# Patient Record
Sex: FEMALE | Race: WHITE | Employment: OTHER | URBAN - METROPOLITAN AREA
[De-identification: names, ages, dates, MRNs, and addresses within clinical notes are randomized per-mention and may not be internally consistent; named-entity substitution may affect disease eponyms.]

---

## 2024-01-28 ENCOUNTER — APPOINTMENT (OUTPATIENT)
Dept: GENERAL RADIOLOGY | Facility: HOSPITAL | Age: 89
End: 2024-01-28
Attending: STUDENT IN AN ORGANIZED HEALTH CARE EDUCATION/TRAINING PROGRAM
Payer: MEDICARE

## 2024-01-28 ENCOUNTER — APPOINTMENT (OUTPATIENT)
Dept: CT IMAGING | Facility: HOSPITAL | Age: 89
End: 2024-01-28
Attending: STUDENT IN AN ORGANIZED HEALTH CARE EDUCATION/TRAINING PROGRAM
Payer: MEDICARE

## 2024-01-28 ENCOUNTER — HOSPITAL ENCOUNTER (EMERGENCY)
Facility: HOSPITAL | Age: 89
Discharge: HOME | End: 2024-01-28
Attending: STUDENT IN AN ORGANIZED HEALTH CARE EDUCATION/TRAINING PROGRAM
Payer: MEDICARE

## 2024-01-28 VITALS
RESPIRATION RATE: 14 BRPM | HEART RATE: 62 BPM | TEMPERATURE: 97 F | SYSTOLIC BLOOD PRESSURE: 118 MMHG | OXYGEN SATURATION: 98 % | DIASTOLIC BLOOD PRESSURE: 53 MMHG

## 2024-01-28 DIAGNOSIS — N30.01 ACUTE CYSTITIS WITH HEMATURIA: Primary | ICD-10-CM

## 2024-01-28 DIAGNOSIS — R44.3 HALLUCINATIONS: ICD-10-CM

## 2024-01-28 LAB
ALBUMIN SERPL-MCNC: 3.6 G/DL (ref 3.2–4.8)
ALBUMIN/GLOB SERPL: 1 {RATIO} (ref 1–2)
ALP LIVER SERPL-CCNC: 100 U/L
ALT SERPL-CCNC: 12 U/L
ANION GAP SERPL CALC-SCNC: 8 MMOL/L (ref 0–18)
AST SERPL-CCNC: 15 U/L (ref ?–34)
BASOPHILS # BLD AUTO: 0.04 X10(3) UL (ref 0–0.2)
BASOPHILS NFR BLD AUTO: 0.5 %
BILIRUB SERPL-MCNC: 0.5 MG/DL (ref 0.2–0.9)
BILIRUB UR QL: NEGATIVE
BUN BLD-MCNC: 28 MG/DL (ref 9–23)
BUN/CREAT SERPL: 12 (ref 10–20)
CALCIUM BLD-MCNC: 10 MG/DL (ref 8.7–10.4)
CHLORIDE SERPL-SCNC: 100 MMOL/L (ref 98–112)
CO2 SERPL-SCNC: 28 MMOL/L (ref 21–32)
COLOR UR: YELLOW
CREAT BLD-MCNC: 2.33 MG/DL
DEPRECATED RDW RBC AUTO: 54.3 FL (ref 35.1–46.3)
EGFRCR SERPLBLD CKD-EPI 2021: 19 ML/MIN/1.73M2 (ref 60–?)
EOSINOPHIL # BLD AUTO: 0.11 X10(3) UL (ref 0–0.7)
EOSINOPHIL NFR BLD AUTO: 1.3 %
ERYTHROCYTE [DISTWIDTH] IN BLOOD BY AUTOMATED COUNT: 15 % (ref 11–15)
GLOBULIN PLAS-MCNC: 3.6 G/DL (ref 2.8–4.4)
GLUCOSE BLD-MCNC: 130 MG/DL (ref 70–99)
GLUCOSE UR-MCNC: NORMAL MG/DL
HCT VFR BLD AUTO: 36.5 %
HGB BLD-MCNC: 11.8 G/DL
HYALINE CASTS #/AREA URNS AUTO: PRESENT /LPF
IMM GRANULOCYTES # BLD AUTO: 0.02 X10(3) UL (ref 0–1)
IMM GRANULOCYTES NFR BLD: 0.2 %
KETONES UR-MCNC: NEGATIVE MG/DL
LEUKOCYTE ESTERASE UR QL STRIP.AUTO: NEGATIVE
LYMPHOCYTES # BLD AUTO: 1.03 X10(3) UL (ref 1–4)
LYMPHOCYTES NFR BLD AUTO: 11.7 %
MCH RBC QN AUTO: 31.8 PG (ref 26–34)
MCHC RBC AUTO-ENTMCNC: 32.3 G/DL (ref 31–37)
MCV RBC AUTO: 98.4 FL
MONOCYTES # BLD AUTO: 0.88 X10(3) UL (ref 0.1–1)
MONOCYTES NFR BLD AUTO: 10 %
NEUTROPHILS # BLD AUTO: 6.69 X10 (3) UL (ref 1.5–7.7)
NEUTROPHILS # BLD AUTO: 6.69 X10(3) UL (ref 1.5–7.7)
NEUTROPHILS NFR BLD AUTO: 76.3 %
OSMOLALITY SERPL CALC.SUM OF ELEC: 289 MOSM/KG (ref 275–295)
PH UR: 7 [PH] (ref 5–8)
PLATELET # BLD AUTO: 240 10(3)UL (ref 150–450)
POTASSIUM SERPL-SCNC: 4.3 MMOL/L (ref 3.5–5.1)
PROT SERPL-MCNC: 7.2 G/DL (ref 5.7–8.2)
PROT UR-MCNC: 20 MG/DL
RBC # BLD AUTO: 3.71 X10(6)UL
RBC #/AREA URNS AUTO: >10 /HPF
SODIUM SERPL-SCNC: 136 MMOL/L (ref 136–145)
SP GR UR STRIP: 1.02 (ref 1–1.03)
UROBILINOGEN UR STRIP-ACNC: NORMAL
WBC # BLD AUTO: 8.8 X10(3) UL (ref 4–11)

## 2024-01-28 PROCEDURE — 85025 COMPLETE CBC W/AUTO DIFF WBC: CPT | Performed by: STUDENT IN AN ORGANIZED HEALTH CARE EDUCATION/TRAINING PROGRAM

## 2024-01-28 PROCEDURE — 80053 COMPREHEN METABOLIC PANEL: CPT | Performed by: STUDENT IN AN ORGANIZED HEALTH CARE EDUCATION/TRAINING PROGRAM

## 2024-01-28 PROCEDURE — 99285 EMERGENCY DEPT VISIT HI MDM: CPT

## 2024-01-28 PROCEDURE — 81001 URINALYSIS AUTO W/SCOPE: CPT | Performed by: STUDENT IN AN ORGANIZED HEALTH CARE EDUCATION/TRAINING PROGRAM

## 2024-01-28 PROCEDURE — 87086 URINE CULTURE/COLONY COUNT: CPT | Performed by: STUDENT IN AN ORGANIZED HEALTH CARE EDUCATION/TRAINING PROGRAM

## 2024-01-28 PROCEDURE — 70450 CT HEAD/BRAIN W/O DYE: CPT | Performed by: STUDENT IN AN ORGANIZED HEALTH CARE EDUCATION/TRAINING PROGRAM

## 2024-01-28 PROCEDURE — 87077 CULTURE AEROBIC IDENTIFY: CPT | Performed by: STUDENT IN AN ORGANIZED HEALTH CARE EDUCATION/TRAINING PROGRAM

## 2024-01-28 PROCEDURE — 36415 COLL VENOUS BLD VENIPUNCTURE: CPT

## 2024-01-28 PROCEDURE — 87186 SC STD MICRODIL/AGAR DIL: CPT | Performed by: STUDENT IN AN ORGANIZED HEALTH CARE EDUCATION/TRAINING PROGRAM

## 2024-01-28 PROCEDURE — 71045 X-RAY EXAM CHEST 1 VIEW: CPT | Performed by: STUDENT IN AN ORGANIZED HEALTH CARE EDUCATION/TRAINING PROGRAM

## 2024-01-28 RX ORDER — FUROSEMIDE 20 MG/1
20 TABLET ORAL AS NEEDED
COMMUNITY

## 2024-01-28 RX ORDER — ALLOPURINOL 100 MG/1
100 TABLET ORAL DAILY
COMMUNITY

## 2024-01-28 NOTE — DISCHARGE INSTRUCTIONS
Thank you for seeking care at [facility].  You have been seen and evaluated.We reviewed the results from your visit in the emergency department. You were found to have an urinary tract infection.  Please read the instructions provided, and if given prescriptions, take as instructed.     Continue with the previously prescribed Bactrim.  If cultures show any resistance to Bactrim, we will call and discuss alternative antibiotic options.    Remember, your care process does not end after your visit today. Please follow-up with your doctor within 1-2 days for a follow-up check to ensure you are  improving, to see if you need any further evaluation/testing, or to evaluate for any alternate diagnoses.     You should return to the emergency department if you develop severe nausea and vomiting AND are unable to keep liquids down, if you develop severe back/flank or stomach pain, or if your symptoms are not clearly improving at home.     We hope you feel better.

## 2024-01-28 NOTE — ED PROVIDER NOTES
[place] Emergency Department Note  Patient: Eve Rodriguez Age: 89 year old Sex: female      MRN: [id number]  : 7/3/1931    Patient Seen in: [facility] Emergency Department    History     Chief Complaint   Patient presents with    Altered Mental Status     Stated Complaint: hallucination not sleeping    History obtained from: Patient's daughter    89-year-old female with past medical history of dementia, CKD, and hyperlipidemia presenting today for evaluation of altered mental status.  Patient's daughter states that the patient is in town from [place].  Typically lives with a different daughter who is her primary caretaker.  States that at baseline, the patient has dementia and has fluctuating mental status.  States that starting 2 days ago, the patient began having visual hallucinations stating that she sees cats and dogs running around her house.  Did not sleep last night due to hallucinations and altered mental status.  Otherwise denies any complaints.  No fevers.  No complaints of pain.  No nausea or vomiting or diarrhea.  Urinating normally.  Eating and drinking without difficulty.  Patient's daughter states that she returns to [place] next week and wants to make sure she was safe to travel.    The patient's daughter who is present at bedside is a pediatrician and states that she started her on Bactrim empirically for suspected urinary tract infection 24 hours ago.  Patient has so far taken 2 doses.    Review of Systems:  Review of Systems  Positive for stated complaint: hallucination not sleeping. Constitutional and vital signs reviewed. All other systems reviewed and negative except as noted above.    Patient History:  Past Medical History:   Diagnosis Date    Dementia (HCC)     Hyperlipidemia        No past surgical history on file.     No family history on file.    Specific Social Determinants of Health:   Social History     Socioeconomic History    Marital status:            [facility]  elements reviewed from today and agreed except as otherwise stated in HPI.    Physical Exam     ED Triage Vitals [01/28/24 1037]   BP (!) 80/40   Pulse 73   Resp 20   Temp 97.4 °F (36.3 °C)   Temp src    SpO2 95 %   O2 Device None (Room air)       Current:/53   Pulse 62   Temp 97.4 °F (36.3 °C)   Resp 14   SpO2 98%         Physical Exam  Constitutional:       General: She is not in acute distress.     Comments: Sleeping comfortably   HENT:      Head: Normocephalic and atraumatic.      Mouth/Throat:      Mouth: Mucous membranes are moist.   Eyes:      Extraocular Movements: Extraocular movements intact.   Cardiovascular:      Rate and Rhythm: Normal rate and regular rhythm.      Heart sounds: Normal heart sounds.   Pulmonary:      Effort: Pulmonary effort is normal. No respiratory distress.      Breath sounds: Normal breath sounds.   Abdominal:      Palpations: Abdomen is soft.      Tenderness: There is no abdominal tenderness.   Skin:     General: Skin is warm and dry.      Capillary Refill: Capillary refill takes less than 2 seconds.      Findings: No rash.   Neurological:      General: No focal deficit present.      Mental Status: She is alert and oriented to person, place, and time.   Psychiatric:         Mood and Affect: Mood normal.         Behavior: Behavior normal.         ED Course   Labs:   Labs Reviewed   COMP METABOLIC PANEL (14) - Abnormal; Notable for the following components:       Result Value    Glucose 130 (*)     BUN 28 (*)     Creatinine 2.33 (*)     eGFR-Cr 19 (*)     All other components within normal limits   URINALYSIS WITH CULTURE REFLEX - Abnormal; Notable for the following components:    Clarity Urine Turbid (*)     Blood Urine 1+ (*)     Protein Urine 20 (*)     Nitrite Urine 1+ (*)     RBC Urine >10 (*)     Bacteria Urine Rare (*)     Squamous Epi. Cells Few (*)     Ca Oxalate Crystals Moderate (*)     Hyaline Casts Present (*)     All other components within normal limits   CBC  W/ DIFFERENTIAL - Abnormal; Notable for the following components:    RBC 3.71 (*)     HGB 11.8 (*)     RDW-SD 54.3 (*)     All other components within normal limits   CBC WITH DIFFERENTIAL WITH PLATELET    Narrative:     The following orders were created for panel order CBC With Differential With Platelet.  Procedure                               Abnormality         Status                     ---------                               -----------         ------                     CBC W/ DIFFERENTIAL[[id number]]          Abnormal            Final result                 Please view results for these tests on the individual orders.   RAINBOW DRAW LAVENDER   RAINBOW DRAW LIGHT GREEN   RAINBOW DRAW BLUE   RAINBOW DRAW GOLD   URINE CULTURE, ROUTINE     Radiology findings:  I personally reviewed the images.   CT BRAIN OR HEAD (21086)    Result Date: 1/28/2024  CONCLUSION:  1. No acute intracranial process by noncontrast CT technique. 2. 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 3. Nonspecific white matter changes involving both cerebral hemispheres that most likely reflect sequelae of chronic microangiopathy. 4. Intracranial atherosclerosis.   Dictated by (CST): Jaime Herrera MD on 1/28/2024 at 1:50 PM     Finalized by (CST): Jaime Herrera MD on 1/28/2024 at 1:52 PM          XR CHEST AP PORTABLE  (CPT=71045)    Result Date: 1/28/2024  CONCLUSION:  1. Negative for radiographically evident acute intrathoracic process. 2. Left chest wall dual lead pacemaker.   Dictated by (CST): Jaime Herrera MD on 1/28/2024 at 12:06 PM     Finalized by (CST): Jaime Herrera MD on 1/28/2024 at 12:08 PM             Cardiac Monitor: Interpreted by me.   Pulse Readings from Last 1 Encounters:   01/28/24 62   , sinus,     External non-ED records reviewed independently by me: No outside records available for review    MDM   89-year-old female with past medical history of dementia, CKD, hyperlipidemia presenting  today for evaluation of altered mental status with new onset of hallucinations over the past 2 days.  Upon arrival to emergency department, patient is well-appearing and in no acute distress.  Initially hypotensive which resolved spontaneously.  Afebrile.  Abdomen is soft and nontender.  Lungs clear to auscultation bilaterally.  No skin rash.  Appears well-perfused and well-hydrated.    Differential diagnoses considered includes, but is not limited to: UTI, electrolyte or metabolic derangement, mass occupying intracranial lesion, aspiration pneumonia, deterioration of dementia    Will obtain the following tests: CBC, CMP, urinalysis, urine culture, CT brain, chest x-ray  Please see ED course for my independent review of these tests/imaging results.    Initial Medications/Therapeutics administered: None    Chronic conditions affecting care: Dementia, CKD, hyperlipidemia    Workup and medications considered but not ordered: None    Social Determinants of Health that impacted care: None     ED course: Laboratory workup is reassuring.  I discussed the patient's creatinine of 2.33 with her daughter who is present at bedside.  Showed me outpatient labs from [place] at an outside facility with baseline creatinine of 1.8.  Patient otherwise without any abnormal electrolyte derangements to suggest acute dehydration.  She appears well-hydrated.  Suspect likely progression of chronic renal disease.  Urinalysis with evidence of borderline urinary tract infection.  Patient has already undergone 24 hours of Bactrim.  Will send culture.  Discussed continuation of Bactrim for likely urinary tract infection.  Independently reviewed the chest x-ray images that show no evidence of pneumonia.  Agree with radiology reads above.  CT brain also with no evidence of mass occupying intracranial lesion.  Patient's daughter would like to take the patient home and continue the Bactrim that was previously initiated.  Stable for discharge home at  this time.  Return precautions were provided and all questions answered.  Patient and patient's daughter expressed understanding and agreement with this plan.    Disposition and Plan     Clinical Impression:  1. Acute cystitis with hematuria    2. Hallucinations        Disposition:  Discharge    Follow-up:  Your PCP    Schedule an appointment as soon as possible for a visit in 2 day(s)  As needed, If symptoms worsen      Medications Prescribed:  Discharge Medication List as of 1/28/2024  2:42 PM            This note may have been created using voice dictation technology and may include inadvertent errors.      Rica Jensen MD  Emergency Medicine

## 2024-01-30 NOTE — PROGRESS NOTES
ED Culture Callback Results Review    Pharmacist reviewed culture results from ED visit .    Final urine culture positive for e. coli. Patient was instructed to continue previously prescribed Sulfamethoxazole/Trimethoprim (Bactrim) on discharge. Current therapy is appropriate based on reported susceptibilities. No further intervention required at this time.        Hiral Parish, Joana  Emergency Medicine Pharmacist Specialist  01/30/24; 1:46 PM